# Patient Record
Sex: MALE | Race: WHITE | URBAN - METROPOLITAN AREA
[De-identification: names, ages, dates, MRNs, and addresses within clinical notes are randomized per-mention and may not be internally consistent; named-entity substitution may affect disease eponyms.]

---

## 2024-08-12 ENCOUNTER — APPOINTMENT (RX ONLY)
Dept: URBAN - METROPOLITAN AREA CLINIC 43 | Facility: CLINIC | Age: 24
Setting detail: DERMATOLOGY
End: 2024-08-12

## 2024-08-12 DIAGNOSIS — D22 MELANOCYTIC NEVI: ICD-10-CM

## 2024-08-12 DIAGNOSIS — B36.0 PITYRIASIS VERSICOLOR: ICD-10-CM

## 2024-08-12 PROBLEM — D22.61 MELANOCYTIC NEVI OF RIGHT UPPER LIMB, INCLUDING SHOULDER: Status: ACTIVE | Noted: 2024-08-12

## 2024-08-12 PROBLEM — D22.62 MELANOCYTIC NEVI OF LEFT UPPER LIMB, INCLUDING SHOULDER: Status: ACTIVE | Noted: 2024-08-12

## 2024-08-12 PROCEDURE — ? COUNSELING

## 2024-08-12 PROCEDURE — ? SUNSCREEN RECOMMENDATIONS

## 2024-08-12 PROCEDURE — ? PRESCRIPTION MEDICATION MANAGEMENT

## 2024-08-12 PROCEDURE — ? PRESCRIPTION

## 2024-08-12 PROCEDURE — 99203 OFFICE O/P NEW LOW 30 MIN: CPT

## 2024-08-12 RX ORDER — KETOCONAZOLE 20 MG/G
CREAM TOPICAL BID
Qty: 30 | Refills: 5 | Status: ERX | COMMUNITY
Start: 2024-08-12

## 2024-08-12 RX ORDER — FLUCONAZOLE 150 MG/1
TABLET ORAL
Qty: 4 | Refills: 2 | Status: ERX | COMMUNITY
Start: 2024-08-12

## 2024-08-12 RX ADMIN — KETOCONAZOLE: 20 CREAM TOPICAL at 00:00

## 2024-08-12 RX ADMIN — FLUCONAZOLE: 150 TABLET ORAL at 00:00

## 2024-08-12 ASSESSMENT — LOCATION DETAILED DESCRIPTION DERM
LOCATION DETAILED: STERNUM
LOCATION DETAILED: RIGHT PROXIMAL DORSAL FOREARM
LOCATION DETAILED: LEFT DISTAL DORSAL FOREARM

## 2024-08-12 ASSESSMENT — LOCATION ZONE DERM
LOCATION ZONE: TRUNK
LOCATION ZONE: ARM

## 2024-08-12 ASSESSMENT — LOCATION SIMPLE DESCRIPTION DERM
LOCATION SIMPLE: LEFT FOREARM
LOCATION SIMPLE: RIGHT FOREARM
LOCATION SIMPLE: CHEST

## 2024-08-12 NOTE — PROCEDURE: COUNSELING
Patient Specific Counseling (Will Not Stick From Patient To Patient): Patient counseled on risks and benefits of dandruff shampoo(leave on for 5 minutes in shower), topical cream, and oral medications. Patient agreed to try topical cream.
Detail Level: Detailed
Detail Level: Generalized

## 2024-08-12 NOTE — HPI: RASH
What Type Of Note Output Would You Prefer (Optional)?: Standard Output
Is The Patient Presenting As Previously Scheduled?: Yes
How Severe Is Your Rash?: mild
Is This A New Presentation, Or A Follow-Up?: Rash
Additional History: Patient stated he has red spots on his chest.

## 2024-08-12 NOTE — PROCEDURE: PRESCRIPTION MEDICATION MANAGEMENT
Detail Level: Zone
Render In Strict Bullet Format?: No
Plan: fluconazole 150 mg tablet \\n- Take one tablet once weekly until chest revolves.\\nketoconazole 2 % topical cream BID\\n- Apply twice daily to rash until clear\\n\\nPatient agreed to try topical cream but will have fluconazole in case the tinea versicolor spreads. Patient will go back to school in [place] in September.

## 2025-01-31 RX ORDER — FLUCONAZOLE 150 MG/1
TABLET ORAL
Qty: 4 | Refills: 2 | Status: ERX

## 2025-01-31 RX ORDER — KETOCONAZOLE 20 MG/G
CREAM TOPICAL BID
Qty: 30 | Refills: 5 | Status: ERX